# Patient Record
Sex: MALE | Race: WHITE | NOT HISPANIC OR LATINO | Employment: PART TIME | URBAN - METROPOLITAN AREA
[De-identification: names, ages, dates, MRNs, and addresses within clinical notes are randomized per-mention and may not be internally consistent; named-entity substitution may affect disease eponyms.]

---

## 2019-10-10 ENCOUNTER — NURSE TRIAGE (OUTPATIENT)
Dept: NURSING | Facility: CLINIC | Age: 79
End: 2019-10-10

## 2019-10-22 ASSESSMENT — MIFFLIN-ST. JEOR: SCORE: 1512.43

## 2019-10-29 ENCOUNTER — ANESTHESIA - HEALTHEAST (OUTPATIENT)
Dept: SURGERY | Facility: CLINIC | Age: 79
End: 2019-10-29

## 2019-10-29 ENCOUNTER — SURGERY - HEALTHEAST (OUTPATIENT)
Dept: SURGERY | Facility: CLINIC | Age: 79
End: 2019-10-29

## 2019-10-29 ASSESSMENT — MIFFLIN-ST. JEOR: SCORE: 1482.91

## 2020-11-25 ENCOUNTER — COMMUNICATION - HEALTHEAST (OUTPATIENT)
Dept: SCHEDULING | Facility: CLINIC | Age: 80
End: 2020-11-25

## 2020-12-07 ENCOUNTER — OFFICE VISIT - HEALTHEAST (OUTPATIENT)
Dept: INTERNAL MEDICINE | Facility: CLINIC | Age: 80
End: 2020-12-07

## 2020-12-07 DIAGNOSIS — I25.10 CORONARY ARTERY DISEASE WITHOUT ANGINA PECTORIS, UNSPECIFIED VESSEL OR LESION TYPE, UNSPECIFIED WHETHER NATIVE OR TRANSPLANTED HEART: ICD-10-CM

## 2020-12-07 DIAGNOSIS — E78.5 DYSLIPIDEMIA: ICD-10-CM

## 2020-12-07 DIAGNOSIS — Z00.00 ENCOUNTER FOR MEDICAL EXAMINATION TO ESTABLISH CARE: ICD-10-CM

## 2020-12-07 DIAGNOSIS — F03.90 DEMENTIA WITHOUT BEHAVIORAL DISTURBANCE, UNSPECIFIED DEMENTIA TYPE: ICD-10-CM

## 2020-12-07 DIAGNOSIS — I10 ESSENTIAL HYPERTENSION: ICD-10-CM

## 2021-01-19 ENCOUNTER — HOSPITAL ENCOUNTER (OUTPATIENT)
Dept: NEUROLOGY | Facility: CLINIC | Age: 81
Setting detail: THERAPIES SERIES
Discharge: STILL ADMITTED OR ADMINISTRATIVE | End: 2021-01-19
Attending: INTERNAL MEDICINE

## 2021-01-19 DIAGNOSIS — F03.90 MAJOR NEUROCOGNITIVE DISORDER (H): ICD-10-CM

## 2021-01-19 DIAGNOSIS — F03.90 DEMENTIA WITHOUT BEHAVIORAL DISTURBANCE, UNSPECIFIED DEMENTIA TYPE: ICD-10-CM

## 2021-01-26 ENCOUNTER — HOSPITAL ENCOUNTER (OUTPATIENT)
Dept: NEUROLOGY | Facility: CLINIC | Age: 81
Setting detail: THERAPIES SERIES
Discharge: STILL ADMITTED OR ADMINISTRATIVE | End: 2021-01-26
Attending: CLINICAL NEUROPSYCHOLOGIST

## 2021-01-26 DIAGNOSIS — F03.90 DEMENTIA WITHOUT BEHAVIORAL DISTURBANCE, UNSPECIFIED DEMENTIA TYPE: ICD-10-CM

## 2021-02-02 ENCOUNTER — HOSPITAL ENCOUNTER (OUTPATIENT)
Dept: NEUROLOGY | Facility: CLINIC | Age: 81
Setting detail: THERAPIES SERIES
Discharge: STILL ADMITTED OR ADMINISTRATIVE | End: 2021-02-02
Attending: INTERNAL MEDICINE

## 2021-02-02 DIAGNOSIS — F03.90 MAJOR NEUROCOGNITIVE DISORDER (H): ICD-10-CM

## 2021-03-09 ENCOUNTER — OFFICE VISIT - HEALTHEAST (OUTPATIENT)
Dept: INTERNAL MEDICINE | Facility: CLINIC | Age: 81
End: 2021-03-09

## 2021-03-09 DIAGNOSIS — R41.89 COGNITIVE DECLINE: ICD-10-CM

## 2021-03-09 DIAGNOSIS — F39 UNSPECIFIED MOOD (AFFECTIVE) DISORDER (H): ICD-10-CM

## 2021-03-09 LAB
ALBUMIN SERPL-MCNC: 4.4 G/DL (ref 3.5–5)
ALP SERPL-CCNC: 85 U/L (ref 45–120)
ALT SERPL W P-5'-P-CCNC: 14 U/L (ref 0–45)
ANION GAP SERPL CALCULATED.3IONS-SCNC: 11 MMOL/L (ref 5–18)
AST SERPL W P-5'-P-CCNC: 19 U/L (ref 0–40)
BILIRUB SERPL-MCNC: 0.5 MG/DL (ref 0–1)
BUN SERPL-MCNC: 21 MG/DL (ref 8–28)
CALCIUM SERPL-MCNC: 9.2 MG/DL (ref 8.5–10.5)
CHLORIDE BLD-SCNC: 105 MMOL/L (ref 98–107)
CO2 SERPL-SCNC: 25 MMOL/L (ref 22–31)
CREAT SERPL-MCNC: 0.87 MG/DL (ref 0.7–1.3)
ERYTHROCYTE [DISTWIDTH] IN BLOOD BY AUTOMATED COUNT: 12.1 % (ref 11–14.5)
FOLATE SERPL-MCNC: 12.5 NG/ML
GFR SERPL CREATININE-BSD FRML MDRD: >60 ML/MIN/1.73M2
GLUCOSE BLD-MCNC: 88 MG/DL (ref 70–125)
HCT VFR BLD AUTO: 48.6 % (ref 40–54)
HGB BLD-MCNC: 16.4 G/DL (ref 14–18)
MCH RBC QN AUTO: 31.1 PG (ref 27–34)
MCHC RBC AUTO-ENTMCNC: 33.7 G/DL (ref 32–36)
MCV RBC AUTO: 92 FL (ref 80–100)
PLATELET # BLD AUTO: 206 THOU/UL (ref 140–440)
PMV BLD AUTO: 10.2 FL (ref 7–10)
POTASSIUM BLD-SCNC: 4.5 MMOL/L (ref 3.5–5)
PROT SERPL-MCNC: 7.1 G/DL (ref 6–8)
RBC # BLD AUTO: 5.28 MILL/UL (ref 4.4–6.2)
SODIUM SERPL-SCNC: 141 MMOL/L (ref 136–145)
TSH SERPL DL<=0.005 MIU/L-ACNC: 1.3 UIU/ML (ref 0.3–5)
VIT B12 SERPL-MCNC: 308 PG/ML (ref 213–816)
WBC: 9 THOU/UL (ref 4–11)

## 2021-03-13 LAB — METHYLMALONATE SERPL-SCNC: 0.42 UMOL/L (ref 0–0.4)

## 2021-03-15 ENCOUNTER — COMMUNICATION - HEALTHEAST (OUTPATIENT)
Dept: INTERNAL MEDICINE | Facility: CLINIC | Age: 81
End: 2021-03-15

## 2021-03-31 ENCOUNTER — COMMUNICATION - HEALTHEAST (OUTPATIENT)
Dept: PEDIATRICS | Facility: CLINIC | Age: 81
End: 2021-03-31

## 2021-03-31 ENCOUNTER — COMMUNICATION - HEALTHEAST (OUTPATIENT)
Dept: ADMINISTRATIVE | Facility: CLINIC | Age: 81
End: 2021-03-31

## 2021-03-31 DIAGNOSIS — Z00.00 ROUTINE GENERAL MEDICAL EXAMINATION AT A HEALTH CARE FACILITY: ICD-10-CM

## 2021-03-31 DIAGNOSIS — R41.3 MEMORY LOSS: ICD-10-CM

## 2021-03-31 DIAGNOSIS — R79.89 OTHER SPECIFIED ABNORMAL FINDINGS OF BLOOD CHEMISTRY: ICD-10-CM

## 2021-04-02 ENCOUNTER — COMMUNICATION - HEALTHEAST (OUTPATIENT)
Dept: ADMINISTRATIVE | Facility: CLINIC | Age: 81
End: 2021-04-02

## 2021-04-02 DIAGNOSIS — G47.30 SLEEP DISORDER BREATHING: ICD-10-CM

## 2021-04-05 ENCOUNTER — COMMUNICATION - HEALTHEAST (OUTPATIENT)
Dept: INTERNAL MEDICINE | Facility: CLINIC | Age: 81
End: 2021-04-05

## 2021-04-05 ENCOUNTER — AMBULATORY - HEALTHEAST (OUTPATIENT)
Dept: LAB | Facility: CLINIC | Age: 81
End: 2021-04-05

## 2021-04-05 ENCOUNTER — TELEPHONE (OUTPATIENT)
Dept: SLEEP MEDICINE | Facility: CLINIC | Age: 81
End: 2021-04-05

## 2021-04-05 DIAGNOSIS — R79.89 OTHER SPECIFIED ABNORMAL FINDINGS OF BLOOD CHEMISTRY: ICD-10-CM

## 2021-04-05 DIAGNOSIS — R41.3 MEMORY LOSS: ICD-10-CM

## 2021-04-05 LAB — HBA1C MFR BLD: 5.4 %

## 2021-04-05 NOTE — TELEPHONE ENCOUNTER
Letter has been mailed to Bill today with instructions to call [phone] if he would like to schedule a virtual sleep consultation.     Tawny MIN CMA, [facility], 4/5/2021 3:34 PM

## 2021-04-12 ENCOUNTER — AMBULATORY - HEALTHEAST (OUTPATIENT)
Dept: NEUROLOGY | Facility: CLINIC | Age: 81
End: 2021-04-12

## 2021-04-20 ENCOUNTER — OFFICE VISIT - HEALTHEAST (OUTPATIENT)
Dept: INTERNAL MEDICINE | Facility: CLINIC | Age: 81
End: 2021-04-20

## 2021-04-20 DIAGNOSIS — R41.89 COGNITIVE DECLINE: ICD-10-CM

## 2021-04-22 ENCOUNTER — COMMUNICATION - HEALTHEAST (OUTPATIENT)
Dept: ADMINISTRATIVE | Facility: CLINIC | Age: 81
End: 2021-04-22

## 2021-06-02 NOTE — ANESTHESIA CARE TRANSFER NOTE
Last vitals:   Vitals:    10/29/19 1542   BP: 151/81   Pulse: 66   Resp: 14   Temp: 37.1  C (98.7  F)   SpO2: 99%     Patient's level of consciousness is awake and drowsy  Spontaneous respirations: yes  Maintains airway independently: yes  Dentition unchanged: yes  Oropharynx: oropharynx clear of all foreign objects    QCDR Measures:  ASA# 20 - Surgical Safety Checklist: WHO surgical safety checklist completed prior to induction    PQRS# 430 - Adult PONV Prevention: 4558F - Pt received => 2 anti-emetic agents (different classes) preop & intraop  ASA# 8 - Peds PONV Prevention: NA - Not pediatric patient, not GA or 2 or more risk factors NOT present  PQRS# 424 - Lulu-op Temp Management: 4559F - At least one body temp DOCUMENTED => 35.5C or 95.9F within required timeframe  PQRS# 426 - PACU Transfer Protocol: - Transfer of care checklist used  ASA# 14 - Acute Post-op Pain: ASA14A - Patient experienced pain >= 7 out of 10

## 2021-06-03 VITALS — HEIGHT: 67 IN | BODY MASS INDEX: 27.83 KG/M2 | WEIGHT: 177.31 LBS

## 2021-06-05 VITALS
DIASTOLIC BLOOD PRESSURE: 80 MMHG | BODY MASS INDEX: 27.88 KG/M2 | SYSTOLIC BLOOD PRESSURE: 128 MMHG | WEIGHT: 178 LBS | HEART RATE: 64 BPM

## 2021-06-05 VITALS
WEIGHT: 178.6 LBS | DIASTOLIC BLOOD PRESSURE: 80 MMHG | HEART RATE: 80 BPM | BODY MASS INDEX: 27.97 KG/M2 | SYSTOLIC BLOOD PRESSURE: 140 MMHG

## 2021-06-05 VITALS
BODY MASS INDEX: 28.35 KG/M2 | HEART RATE: 72 BPM | SYSTOLIC BLOOD PRESSURE: 134 MMHG | WEIGHT: 181 LBS | DIASTOLIC BLOOD PRESSURE: 80 MMHG

## 2021-06-05 NOTE — ANESTHESIA POSTPROCEDURE EVALUATION
Patient: Roland Caballero  CYSTOSCOPY URETHRAL DILITATION RIGHT RETROGRADE PYELOGRAM, RIGHT URETEROSCOPY WITH LASER LITHOTRIPSY RIGHT URETERAL STENT  PLACEMENT STONE EXTRACTION  Anesthesia type: general    Patient location: PACU  Last vitals:   Vitals Value Taken Time   /87 10/29/2019  4:56 PM   Temp 36.9  C (98.5  F) 10/29/2019  4:21 PM   Pulse 77 10/29/2019  4:56 PM   Resp 20 10/29/2019  4:56 PM   SpO2 96 % 10/29/2019  4:56 PM     Post vital signs: stable  Level of consciousness: lethargic and responds to simple questions  Post-anesthesia pain: pain controlled  Post-anesthesia nausea and vomiting: no  Pulmonary: unassisted, return to baseline  Cardiovascular: stable and blood pressure at baseline  Hydration: adequate  Anesthetic events: no    QCDR Measures:  ASA# 11 - Lulu-op Cardiac Arrest: ASA11B - Patient did NOT experience unanticipated cardiac arrest  ASA# 12 - Lulu-op Mortality Rate: ASA12B - Patient did NOT die  ASA# 13 - PACU Re-Intubation Rate: ASA13B - Patient did NOT require a new airway mgmt  ASA# 10 - Composite Anes Safety: ASA10A - No serious adverse event    Additional Notes:

## 2021-06-13 NOTE — PROGRESS NOTES
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  He was referred to neuropsych today.  Follow-up with us as needed.    #2.  Hyperlipidemia.  Will need to have him in for a complete physical exam to check his lipids within the next few months should he choose to continue having his primary care at this office.    Coronary artery disease, stable.    Problem List Items Addressed This Visit     RESOLVED: Essential hypertension    Dyslipidemia    CAD (coronary artery disease)      Other Visit Diagnoses     Dementia without behavioral disturbance, unspecified dementia type (H)    -  Primary    Relevant Orders    Neuropsychological testing    Encounter for medical examination to establish care              There are no Patient Instructions on file for this visit.    Medications Discontinued During This Encounter   Medication Reason     oxyCODONE-acetaminophen (PERCOCET) 5-325 mg per tablet Therapy completed     aspirin 81 mg chewable tablet      atorvastatin (LIPITOR) 80 MG tablet      nitroglycerin (NITROSTAT) 0.4 MG SL tablet      ondansetron (ZOFRAN-ODT) 4 MG disintegrating tablet        No follow-ups on file.    CHIEF COMPLAINT:  Chief Complaint   Patient presents with     Establish Care       HISTORY OF PRESENT ILLNESS:  Roland Caballero is a 80 y.o. male  presenting to the clinic today for establishing care along with a neuropsych referral.  Marcelino is feeling well today and has no acute complaints.  Past medical history is consistent for coronary artery disease, status post stenting in 2014.  He also has a history of dyslipidemia.  He is on no prescription medications at this time.  He has no known drug allergies.  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  Lifelong non-smoker.    The reason for his visit is the need for referral to a neuropsychologist.  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  He apparently needed more time than was normal to fill out the questions and was unsure of how to pay for his renewal.  They also noticed that he had a hand tremor as well.  Because of these, he had a formal request for evaluation by the Board of dentistry in order to continue being licensed.  They recommended a full neuropsychologic evaluation to be performed and sent to them.  Marcelino does not have a primary care physician and thus presented to me to get this referral.    REVIEW OF SYSTEMS:   Pertinent positives noted in HPI, remainder of ROS is negative.    MEDICATIONS:  No current outpatient medications on file.     No current facility-administered medications for this visit.        TOBACCO USE:  Social History     Tobacco Use   Smoking Status Never Smoker   Smokeless Tobacco Never Used       VITALS:  Vitals:    12/07/20 1503   BP: 140/80   Pulse: 80   Weight: 178 lb 9.6 oz (81 kg)     Wt Readings from Last 3 Encounters:   12/07/20 178 lb 9.6 oz (81 kg)   10/29/19 177 lb 5 oz (80.4 kg)         PHYSICAL EXAM:  Constitutional:  Reveals an alert, pleasant male.   HEET: Normocephalic, without obvious abnormality, atraumatic.   Neurologic: Normal gait and station  Psychologic: Normal affect

## 2021-06-13 NOTE — TELEPHONE ENCOUNTER
"Patient calls because he is needing to set up an appointment with a provider. 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 He says that \"he never writes.\" Denies any further concerns.    Patient is sent to scheduling to set up an appointment.    Anita Monroy RN, BSN Nurse Triage Advisor 11:59 AM 11/25/2020        "

## 2021-06-13 NOTE — TELEPHONE ENCOUNTER
New Appointment Needed  What is the reason for the visit:    Same Date/Next Day Appt Request  What is the reason for your visit?: wants to see Dr. Prem Chralton as his PCP- And needs to be referred to  Neuropsychology  Patient spouse discussed with Dr. Charlton about taking the patient on and becoming his PCP    Provider Preference: Dr. Prem Charlton  How soon do you need to be seen?: as soon as possible  Waitlist offered?: No  Okay to leave a detailed message:  Yes

## 2021-06-14 NOTE — PROGRESS NOTES
"NEUROPSYCHOLOGY TELE-HEALTH FEEDBACK VISIT  [facility]      Telephone Visit  Roland Caballero is a 80 y.o. male who is being evaluated via a billable telephone visit.     The patient has been notified of the following:      \"This telephone visit will be conducted via a call between you and your provider. We have found that certain health care needs can be provided without the need for a physical exam.  This service lets us provide the care you need with a phone conversation. If during the course of the call the provider feels a telephone visit is not appropriate, you will not be charged for this service.\"     Patient has given verbal consent to a Telephone visit? Yes  Consent has been obtained for this service by 1 care team member: yes.      Visit Summary  The purpose of today s appointment was to provide feedback regarding Mr. Caballero recent neuropsychological telehealth consult completed on 1/19 and 1/26/2021. Unfortunately, his wife was unable to join us for the visit today. We began the session by discussing his experience during the evaluation. Mr. Caballero reported that he was feeling frustrated during the testing session, and noted that \"I never do those things in my daily life\" and \"I just wanted to get it over with.\" He acknowledged that some things were quite difficult, which he attributed to being unfamiliar with the test stimuli.     I provided Mr. Caballero with detailed feedback regarding his performance on cognitive testing and his pattern of cognitive strengths and weaknesses. The patient again stated that he was not putting forth his best effort during testing, and while that may be true to some extent, most of his responses are very much suggestive of genuine cognitive dysfunction. There were no concerns about effort or low motivation/engagement noted by the examiner, and embedded effort indices were within normal limits.     I also raised concern about the observations of " "mild right-handed tremor during testing. Mr. 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 It may be that his tremor is anxiety-induced; regardless, further evaluation remains warranted given the nature of his job. Per [place] [employer] Rules of Conduct, I am required to report any concern about another provider who may be \"unable to practice with reasonable skill and safety as a result of a physical or mental illness or condition.\" I contacted the Board of Dentistry on 2/2/2021. I also reached out to the referring provider, Dr. Charlton, and routed my report to him.    Mr. Caballero acknowledged that he may have some mild cognitive difficulties and is willing to pursue further evaluation with Dr. Charlton and potentially neurology (if Dr. Charlton agrees that this would be an appropriate referrral). I discussed my recommendations and provided the opportunity for Mr. Caballero to ask any questions that he had about the evaluation. At the end of the session, he indicated that he understood the results and that I had answered all of his questions. Despite the discrepancies between the test results and his subjective experiences of his daily functioning, he remained pleasant and was appreciative of my concern and our conversation today.      Little Patino PsyD, LP  Licensed Clinical Neuropsychologist  [facility]  [address], [address]  [place] [address]  Phone: [phone]    Telephone Visit Details    Type of service:  Telephone Visit  Start Time: 11:00 AM  End Time: 11:45 AM    Originating Location (pt. Location): Home    Distant Location (provider location):  Remote work for [facility])    Mode of Communication:  Telephone    Roland Caballero was evaluated via a billable telephone visit. For diagnostic " and coding purposes, Mr. 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 As this is the final date for this Episode of Care (initiated on 1/19/2021) all charges for the entire Episode of Care will be filed today. Please see the 1/19/2021 evaluation for a detailed description of codes and services, including services provided today.      In brief:   1 x 96116  1 x 96132  2 x 96133  1 x 96138  7 x 96139

## 2021-06-14 NOTE — PROGRESS NOTES
The patient was seen for a neuropsychological evaluation for the purposes of diagnostic clarification and treatment planning. 74 minutes of telehealth testing and 85 minutes of face-to-face testing were provided by this writer. An additional 84 minutes were spent scoring and compiling test results.The patient was cooperative with testing. No concerns were brought to my attention. Please see Dr. Patino's report for a detailed description of the charges and interpretation and integration of the findings.    Face-to-face Start Testin ()  Face-to-face Stop Testin ()  Telehealth Start Testin ()  Telehealth Stop Testin ()  Scoring Start: 0806 ()  Scoring Stop: 0930 ()

## 2021-06-14 NOTE — PROGRESS NOTES
NEUROPSYCHOLOGICAL PROGRESS NOTE    NAME: Roland Caballero  YOB: 1940     DATE OF EVALUATION: 1/26/2021      Roland Caballero is a 80 y.o. male who was referred for a cognitive evaluation by his PCP, Prem Charlton MD.  Mr. Caballero already completed the clinical interview with me via telehealth on 1/19/2021 and returns for an in-clinic visit today in order to complete the testing portion of the appointment. He was administered a comprehensive neuropsychological battery via hybrid methods (i.e., face-to-face testing and remote testing from a separate room in the clinic). He was cooperative during testing and test results appear valid. Please refer to the note by trained examiner/technician from today's date for further details of today's visit. Test results are currently still being compiled and scored; therefore, impressions and findings will be provided in a forthcoming report.(typically posted within 1-2 weeks from today's date).       Little Patino PsyD, LP  Licensed Clinical Neuropsychologist  [facility] [address], [address]  [place] [address]  Phone: [phone]

## 2021-06-14 NOTE — PATIENT INSTRUCTIONS - HE
"SUMMARY OF FINDINGS:   Due to the current COVID-19 pandemic that limits in-person clinical visits, this assessment was conducted using a hybrid model of both telehealth and face-to-face methods. The face-to-face portions were conducted with PPE worn by the examiner and a face-mask for the patient. The standard administration of these tests involves in-person, direct face-to-face methods. The full impact of applying non-standard administration methods via remote technology or with PPE is not fully appreciated at this time. As such, the diagnostic conclusions and recommendations for treatment provided in this report are being advanced with caution.    With these limitations in mind, results of testing indicate that Mr. Caballero is of estimated average to high average premorbid intellectual functioning; however, most of Mr. Caballero's performances fall well below that estimate. Specifically, he exhibits largely moderate to severe impairments in auditory-verbal attention/concentration, verbal and nonverbal learning/memory, confrontation naming, semantic fluency, and executive functions (including mental flexibility/set-shifting, novel problem-solving, and cognitive inhibition). In addition, he is unable to state the current year (\"19... no, 90...\"), underestimated the current time by nearly 7 hours, and noted his current age to be \"74... no, 76\" (instead of 80). In addition, information processing speed is variable, with scores ranging from exceptionally low to average. The only performances that are within normal limits are on tests of visuospatial processing, single-word reading, and manual dexterity with his dominant (right) hand. Qualitatively, he exhibited a mild tremor in his right hand, which was observed both at rest and in action throughout the testing session.     With regard to his learning/memory more specifically, he appears to exhibit difficulties learning new information for both verbal and visual materials. " His inefficient learning may be exacerbated by his impaired attention/concentration and variable processing speed to at least some degree. His memory of what he initially learns is also impaired for his age, with retention rates ranging from 0% (on a word list task) to 38% (on a story task) for verbal information and 50% for visual information after a delay of 20 minutes. Of note, he was unable to recall ever being read the word list after it was presented to him three times 20 minutes prior. On 2 out of the 3 memory tasks, his recall was not aided by prompts/cues on recognition testing. He did better on the word list recognition trial (with a score in the low average range), but was also noted by the examiner to be guessing throughout the task. Overall, his performances on memory testing are most suggestive of an encoding deficit.    Emotionally, the patient endorses minimal symptoms of anxiety and depression on self-report questionnaires. This is consistent with he and his wife's reports during the clinical interview. Current suicidal ideation is denied.    PRELIMINARY IMPRESSIONS:    Test results reveal rather profound impairments in most cognitive domains, including auditory-verbal attention/concentration, verbal and nonverbal learning/memory, confrontation naming, semantic fluency, and executive functions. In addition, information processing speed is variable.    Visuospatial processing is a relative strength, with scores generally falling in the average range for his age.    Overall, test results are suggestive of mild-to-moderate dysfunction in the bilateral prefrontal and temporal regions, including mesial temporal structures.    These results are in paez contrast to the patient and his wife's subjective reports, as they both denied having any significant concerns about the patient's cognitive functioning. 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 I suspect that the root canal procedure is a rather rote task at this point, and that routine and rote tasks are supporting his ability to perform various activities without significant issues at this point, despite having clear dementia-level cognitive impairment.    Etiology of his current cognitive difficulties is uncertain but likely multifactorial. Further neurologic and medical evaluations are warranted in order to rule out reversible/treatable causes of cognitive impairment (e.g., infection, vitamin deficiencies, etc.) and obtain more diagnostic clarity.     If all other reversible causes of cognitive impairment are ruled out, a combination of neurodegenerative syndrome(s) should be considered. Many aspects of his profile support an Alzheimer's disease process (e.g., encoding deficits on memory testing, impaired confrontation naming, and impaired semantic fluency with better phonemic fluency). In addition, consider possible Parkinson's disease or vascular dementia processes given his prefrontal dysfunction and asymmetrical tremor.    The patient denies any issues with balance, changes in his gait, or incontindence problems, making NPH less likely. Personality or behavioral changes were denied, making frontotemporal dementia unlikely as well. He is not taking any medications at this time, his sleep is normal, and he is not using any substances. There is no support for a psychiatric etiology either.    PRELIMINARY DIAGNOSIS:  Unspecified Major Neurocognitive Disorder (further medical/neurologic evaluation needed)    RECOMMENDATIONS:  1) Referral to neurology is strongly recommended for further evaluation, monitoring, and (potentially) treatment. His primary care provider will likely order this if the patient is amenable.    2) Consider obtaining neuroimaging (preferably MRI) in order to help clarify the possible  etiology of Mr. Caballero's cognitive difficulties. This recommendation will be deferred to the patient's primary care provider and/or neurology.    3) Consider obtaining relevant laboratory tests to help clarify etiology (e.g., vitamin B12, MMA, folate, TSH, etc.). Also consider checking A1c, cholesterol, and other blood work panels given the patient's wife's concerns about his significant weight loss and possible symptoms of low blood sugar and diabetes. This recommendation will be deferred to the patient's primary care provider and/or neurology.    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 I recommend that he take a medical leave from work until further work-up of these difficulties can be completed. If reversible/treatable causes of his cognitive impairment are not found, I would recommend that he retire. He is already planning to retire in a few months.    5) Given his profile characterized by executive dysfunction, inattention, and variable processing speed, Mr. Caballero is strongly encouraged to continue to refrain from driving, at least until further evaluation of his cognitive deficits can be performed. If he insists that he is still safe to drive, he is encouraged to undergo a formal driving evaluation. Possible options for formal driving evaluations would be: [facility] ([phone]), [facility] ([phone]) or any option recommended by his physician.     6) Increased oversight is also recommended while performing other complex and/or novel tasks, such as financial management. If not already done, completion of paperwork for advance directives and assignment of healthcare and financial Power of  should be considered at this time.    7) The patient is encouraged to utilize cognitive compensatory strategies in daily life, including utilizing note pads, checklists, to-do lists, a calendar/planner, labeled alarm  reminders, a GPS, a pillbox, and maintaining a daily morning and nighttime routine and an organized living/work environment.    8) Repeat neuropsychological testing can be performed in 12-18 months (or as clinically indicated) in order to monitor his cognitive status, help to clarify etiology, and update recommendations. The current test data can be used as a baseline to which future comparisons can be made.

## 2021-06-14 NOTE — PROGRESS NOTES
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 digits backward (a below average score), and up to 2 digits in sequence (an exceptionally low score). On a test of complex concentration that requires speeded numeric sequencing (Trails A), the patient's score was average for completion time and without error. In contrast, on a more difficult version of that task that requires speeded alpha-numeric sequencing/cognitive set-shifting (Trails B), the task was discontinued after time time limit was reached; the patient became stuck at the letter F and made 4 total errors during that time.     PROCESSING SPEED. On the DKEFS Color-Word Interference Test, speeded color naming and speeded word reading were both exceptionally low.     LANGUAGE PROCESSING. Language comprehension appeared largely intact. Confrontation naming was measured as an exceptionally low score (24/60). He required several semantic cues and made numerous semantic substitution errors on that task, and benefited minimally from phonemic cues (+11/36). The patient's performance on a test of phonemic fluency resulted in a low average score (COWAT) while his " "semantic fluency was exceptionally low (Category Fluency). His writing sample was within normal limits with no evidence of micrographia (he wrote, \"I love my wife!\").    VISUOSPATIAL ABILITY. The WAIS-IV Perceptual Reasoning Index was in the low average range (pro-rated MIN = 88), with average nonverbal abstract reasoning (Matrix Reasoning), and average visuoconstruction with three-dimensional blocks (Block Design). Of note, it took about 10 minutes to explain to him how to position the blocks on the sample items of the Block Design task. Spatial judgment, as measured by Judgment of Line Orientation, was classified as an average score. Copy of a complex geometric figure was within normal limits but piecemeal in approach (RCFT Copy). Of note, a mild tremor was observed during that task and it took him over 7 minutes to copy the figure (he was only given credit for what was drawn up to 5 minutes, which is the maximum time limit for that task). Copy of six simple geometric figures was within normal limits (BVMT-R Copy). Once again, a slight tremor was noted. On a Clock Drawing Task, the patient was able to draw a small but well-formed [place]. He wrote in the numbers in clockwise order, which were adequately spaced and correctly placed on the right side of the clock; however, his spacing and placing of the numbers were slightly off on the left side of the clock, suggestive of suboptimal planning. Although he was instructed to make the clock read a specified time, he omitted the clock hands altogether.      LEARNING/MEMORY. On the WMS-IV Logical Memory subtest, immediate memory for paragraph-length stories was scored as below average. His score on the delayed memory was also below average, with a 38% retention rate. Delayed recognition of that same material was exceptionally low, and he appeared to be guessing on that task.     On a 12-item verbal list-learning task (HVLT-R), the patient acquired up to 4 words (33%) of " the word list by the 3rd and final learning trial (raw scores over trials = 1, 4, 4). Total learning acquisition was classified as an exceptionally low score. After a 20-minute delay, the patient was unable to recall any of the items (0% retention rate) and was unable to recall ever being read the list to begin with (an exceptionally low score). However, his recognition discriminability score was low average, as he correctly recognized 10/12 items and made 2 false-positive errors.    On a visual memory measure (BVMT-R), immediate recall of six simple geometric figures over three learning trials was scored as exceptionally low (raw scores over trials = 1, 2, 4 out of 12). Delayed recall of the figures was also exceptionally low, with a 50% retention rate (raw score = 2 out of 12). Recognition discriminability was also classified as an exceptionally low score, as he correctly recognized 6/6 figures but also made 5 false-positive errors.     EXECUTIVE FUNCTIONS. Concept identification and the ability to generate alternative problem solving strategies was below expectation for age on the [place] Card Sorting Test. Specifically, he completed 0 out of 3 categories (below average) with a total of 41 errors, which is an exceptionally low score. Thirty of his errors were perseverative (an exceptionally low score) and there was one failure to maintain set. On a test of inhibition and cognitive flexibility (DKEFS Color-Word Interference Inhibition trial), the patient had notable difficulty comprehending task demands and was unable to complete the sample items. Thus, that task was discontinued. On a task that requires speeded alpha-numeric sequencing/cognitive set-shifting (Trails B), the task was discontinued at the maximum time limit after the patient became stuck at letter F (he had made 4 errors at that point as well). Nonverbal abstract reasoning was classified as an average score (WAIS-IV Matrix Reasoning). Phonemic  fluency was low average (COWAT). Performance on the Clock Drawing Test was impaired, as he was unable to accurately represent analog time.     PSYCHOMOTOR FUNCTIONS. Manual dexterity, as measured by the Grooved Pegboard test, was low average with his dominant (right) hand. A mild tremor was noted, and he often attempted to  the pegs with both hands (instead of just his dominant hand, as instructed). He dropped one peg during the task. He was unable to complete the task with his left hand due to mobility issues s/p surgery.     MOOD. On the GDS-15 a self report measure of depressive symptomatology, he obtained a score of 1, placing him in the range of normal depression. He denied suicidal ideation. On the CORRINE-7, a self-report measure of anxiety, he obtained a score of 3,  placing him in the range of minimal anxiety.    ____________________________________________________________________________________    SERVICES PROVIDED & TIME:  Video Visit Details  Type of service: Video Visit    Interview & Initial Testing with Provider (Dr. Little Patino) on 1/19/2021:   Video Start Time: 12:30 PM   Video End Time: 1:43 PM    Originating Location (pt. Location): Patient's Home  Distant Location (provider location): Remote work for [facility])    Mode of Communication:  Video Conference via [employer]     On-site Office Visit Details   (including both face-to-face time and remote testing with on-site equipment)  Type of service: Office Visit    Face-to-Face Testing with Trained Examiner/Technician (Mariann Ellis) on 1/26/2021:   Start Time: 12:23 PM   End Time: 1:48 PM    In-Office Remote Testing with Trained Examiner/Technician (Mariann Ellis) on 1/26/2021:   Video Start Time: 1:53 PM   Video End Time: 3:07 PM    Originating and Distant Location (patient and provider location): [facility])    Mode of  Communication: Video Conference via [employer]    A clinical interview/neurobehavioral status examination was conducted with the patient and documented. I thoroughly reviewed the medical record, selected the neuropsychological test battery, provided supervision to the trained examiner/technician, interpreted/integrated patient data and test results, and engaged in clinical decision making, treatment planning, report writing/preparation and provision of interactive feedback of test results on 2/2/2021. A trained examiner/technician administered and scored the neuropsychological tests (2+ tests).  Please see below for a breakdown of time spent and the associated codes billed for these services.  Services   Time Spent  CPT Codes   Neurobehavioral Status Exam:  (e.g., clinical interview and neurobehavioral status exam via telehealth, interpretation, report)   82 minutes   1 x 96116     Neuropsychological Evaluation Services:   (e.g., integration, interpretation, treatment planning, clinical decision making, feedback via telehealth)   201 minutes   1 x 96132  2 x 96133   Neuropsychological Testing by Trained Examiner/Technician:  (e.g., test administration, scoring, 2+ tests administered)   243 minutes   1 x 96138  7 x 96139   For diagnostic and coding purposes, Mr. Caballero has a history of coronary artery disease, hyperlipidemia,  history of MI and stent placement in 2014, and tremor. 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 Please note, all charges are filed at the completion of the Episode of Care and associated with the final encounter date (feedback session on 2/2/2021).

## 2021-06-15 NOTE — PROGRESS NOTES
Assessment & Plan   Problem List Items Addressed This Visit     None      Visit Diagnoses     Cognitive decline    -  Primary    Relevant Orders    HM2(CBC w/o Differential)    Comprehensive Metabolic Panel    Thyroid Cascade    Vitamin B12    Folate, Serum    Methylmalonic Acid (MMA), Quantitative    Unspecified mood (affective) disorder (H)         Relevant Orders    Thyroid [place]         Cognitive dysfunction of unknown etiology.  We will begin by checking a CBC, CMP, thyroid cascade, B12 level, folate level, and an MMA level.  I recommended getting an MRI of the brain but they wanted to hold off on this until the lab tests return.  I will call him with results and further recommendations.            No follow-ups on file.    Prem Charlton MD  [facility]   Roland Caballero is 80 y.o. 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  I last saw him in early December 2020.  At that time he was making some mistakes on his application for his license renewal.  The Board of dentistry was concerned about cognitive deficits and was told to follow-up with his primary care physician about this.  Since he did not have a primary he made an appointment with me and he was subsequently referred for neuropsychiatric testing.  He underwent testing on 1/19/2021, which showed evidence of cognitive dysfunction of unknown etiology, likely involving the bilateral prefrontal and temporal cortices.  He was also noted to have a tremor in the right hand both at rest and with activity.  The results of the test were given to him and he was told to follow-up with me today for further evaluation and work-up.  Roland and I spent some time going over his tests and discussing what they meant.  I explained that we currently do not know an etiology for his cognitive dysfunction and  will need to look into it further today.          Objective    There were no vitals taken for this visit.  There is no height or weight on file to calculate BMI.  Physical Exam

## 2021-06-16 PROBLEM — E78.5 DYSLIPIDEMIA: Status: ACTIVE | Noted: 2018-09-04

## 2021-06-16 PROBLEM — I25.10 CAD (CORONARY ARTERY DISEASE): Status: ACTIVE | Noted: 2018-09-04

## 2021-06-16 NOTE — TELEPHONE ENCOUNTER
Called pt. Him and his wife said that they discussed drawing a glycohgb with you at the last visit. They want to know why it wasn't ordered. Will you order this?

## 2021-06-16 NOTE — TELEPHONE ENCOUNTER
Dr. Charlton's comments from the most lab result letter was read off to the patient. This is being printed off and mailed again to make sure he gets it.

## 2021-06-16 NOTE — TELEPHONE ENCOUNTER
Reason for Call:  Request for results:    Name of test or procedure: Labs    Date of test of procedure: 3/9/2021    Location of the test or procedure:  Clinic    OK to leave the result message on voice mail or with a family member? Yes    Phone number Patient can be reached at: Home number on file [phone] (home)    Additional comments: Got the results back on the low Vit b12 but didn't get any other info on his other labs. How was his diabetes labs? Does the low vit b12 cause sxs? Please call to discuss.     Call taken on 3/31/2021 at 1:38 PM by Linda Campbell

## 2021-06-16 NOTE — PROGRESS NOTES
Marcelino came in today essentially just to update me on his memory difficulties.  He is going to see a neurologist in regards to these in 10days.  He has had formal neuropsych testing done which show profound impairment over multiple cognitive domains.  He is going to be seeing neurology to establish diagnosis.  I will await their recommendations.  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  Follow-up with us as needed.  No charge visit.

## 2021-06-16 NOTE — TELEPHONE ENCOUNTER
Pt was seen on Tues, the [facility] was supposed to fax the information right away to their .  has not received anything.

## 2021-06-16 NOTE — TELEPHONE ENCOUNTER
Reason for Call: Request for an order or referral:    Order or referral being requested: referral    Date needed: at your convenience    Has the patient been seen by the PCP for this problem? no    Additional comments:  Requesting order for Sleep apnea testing.  Reports that patient snores, stops breathing for approx 20 seconds or more while sleeping.   Did not address during appointment do to the circumstances he presented for.   Wondering what kind of symptom this would cause other than sleep apnea?    Phone number Patient can be reached at:  Home number on file [phone] (home)    Best Time:  any    Can we leave a detailed message on this number?  Yes    Call taken on 4/2/2021 at 10:47 AM by Laura L Goldberg

## 2021-06-16 NOTE — TELEPHONE ENCOUNTER
Reason for Call:  Other returning call      Detailed comments: [facility] health Care team calling with patient on the call. Patient is confused on lab work results. Marcelino is wanting his diabetic lab work. Upon looking at the patient's labs that were drawn I don't see an A1c that the patient thought was drawn. Please advise.        Phone Number Patient can be reached at: Home number on file [phone] (home)    Best Time: Anytime    Can we leave a detailed message on this number?: Yes    Call taken on 3/31/2021 at 2:19 PM by Rashi Garcia

## 2021-06-16 NOTE — PROGRESS NOTES
"Neuropsychology Note    I received a phone call from Lory Caballero (Roland Caballero's wife) asking for clarification about my recommendations based on his recent neuropsychological evaluation that I conducted on 2021 and 2021. I have been given permission by the patient to speak with Ms. Caballero.    I reiterated to Ms. 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 The decision about his ability to return to work would then ultimately be deferred to his primary care provider and/or neurologist based on their medical and neurologic assessment(s). I recommended that if no reversible or treatable causes are found, then he should retire, as cognitive impairment and tremor can result in an inability to practice within reasonable skill and safety.     Per [place] [employer] Rules of Conduct, I am required to report any concern about another provider who may be \"unable to practice with reasonable skill and safety as a result of a physical or mental illness or condition.\" I submitted my concern to the Board of Dentistry on 2021 as a mandated .    I also reiterated my recommendations to Ms. Caballero about what would be helpful as next steps, as she is understandably frustrated by the situation. I recommended the followin) Ask Dr. Charlton for a referral to neurology for further evaluation of his cognitive impairment and tremor. They can further assess Dr. Caballero's tremor and determine the nature of it (whether it is due to anxiety, low blood sugar, a benign essential tremor, a parkinsonism, etc.) and whether it can be " treated or managed. I routed my report to Dr. Charlton on 2/2/2021 and included my recommendation to enter a neurology order for them.     2) If desired, Dr. Caballero may seek a second opinion from a different neuropsychologist, particularly if Dr. Caballero feels strongly that he was not putting forth his full effort or if he was feeling overly anxious during testing. I am confident in the validity of my findings, but a second opinion for their own reassurance may be appropriate.    I will re-send Dr. Caballero a summary of my findings and recommendations, which will be mailed to his home address.    Little Patino PsyD, LP  Licensed Clinical Neuropsychologist  [facility]  [address], [address]  [place] [address]  Phone: [phone]

## 2021-06-16 NOTE — TELEPHONE ENCOUNTER
Left voicemail for patient to return call to clinic. When patient returns call, please give them below message.    Per Dr. 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 This is up to the [employer] only. Dr. Charlton declines sending a note to . If they have further questions, they will need to discuss this further with Dr. Charlton.    [facility] ............... 3:57 PM, 04/22/21     ADDENDUM:  Marcelino did not bring up anything at his appointment in regards to a letter he wanted me to send to his .  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  That is COMPLETELY up to the [place] Board of [employer].  I can talk to he and his wife Loyr over the phone if desired    [facility] 4/22, 5833

## 2021-06-16 NOTE — TELEPHONE ENCOUNTER
Wife called back, Pt had asked to get his diabetes labs done that day because he had lost a lot of weight. Doesn't look like it was done. Can provider place in order and he will come in for labs.     pls call her back.

## 2021-06-21 NOTE — LETTER
Letter by Prem Charlton MD at      Author: Prem Charlton MD Service: -- Author Type: --    Filed:  Encounter Date: 3/15/2021 Status: (Other)         Roland GILL Caballero  [address] [place] [address]             March 15, 2021         Dear Mr. Caballero,    Below are the results from your recent visit:    Resulted Orders   HM2(CBC w/o Differential)   Result Value Ref Range    WBC 9.0 4.0 - 11.0 thou/uL    RBC 5.28 4.40 - 6.20 mill/uL    Hemoglobin 16.4 14.0 - 18.0 g/dL    Hematocrit 48.6 40.0 - 54.0 %    MCV 92 80 - 100 fL    MCH 31.1 27.0 - 34.0 pg    MCHC 33.7 32.0 - 36.0 g/dL    RDW 12.1 11.0 - 14.5 %    Platelets 206 140 - 440 thou/uL    MPV 10.2 (H) 7.0 - 10.0 fL   Comprehensive Metabolic Panel   Result Value Ref Range    Sodium 141 136 - 145 mmol/L    Potassium 4.5 3.5 - 5.0 mmol/L    Chloride 105 98 - 107 mmol/L    CO2 25 22 - 31 mmol/L    Anion Gap, Calculation 11 5 - 18 mmol/L    Glucose 88 70 - 125 mg/dL    BUN 21 8 - 28 mg/dL    Creatinine 0.87 0.70 - 1.30 mg/dL    GFR MDRD Af Amer >60 >60 mL/min/1.73m2    GFR MDRD Non Af Amer >60 >60 mL/min/1.73m2    Bilirubin, Total 0.5 0.0 - 1.0 mg/dL    Calcium 9.2 8.5 - 10.5 mg/dL    Protein, Total 7.1 6.0 - 8.0 g/dL    Albumin 4.4 3.5 - 5.0 g/dL    Alkaline Phosphatase 85 45 - 120 U/L    AST 19 0 - 40 U/L    ALT 14 0 - 45 U/L    Narrative    Fasting Glucose reference range is 70-99 mg/dL per  American Diabetes Association (ADA) guidelines.   Thyroid Cascade   Result Value Ref Range    TSH 1.30 0.30 - 5.00 uIU/mL   Vitamin B12   Result Value Ref Range    Vitamin B-12 308 213 - 816 pg/mL   Folate, Serum   Result Value Ref Range    Folate 12.5 >=3.5 ng/mL   Methylmalonic Acid (MMA), Quantitative   Result Value Ref Range    MMA Serum/Plasma, Vitamin B12 Status 0.42 (H) 0.00 - 0.40 umol/L      Comment:        Slight elevation 0.41-0.99 umol/L         Consistent with mild vitamin B12 deficiency, renal         insufficiency, or intravascular volume  contraction.    Moderate elevation 1.00-9.99 umol/L          Consistent with mild vitamin B12 deficiency.    Massive elevation - Greater than or equal to 10 umol/L          Consistent with significant vitamin B12 deficiency          or with inborn errors of metabolism.  INTERPRETIVE INFORMATION: MMA Serum/Plasma,                             Vitamin B12 Status    This test was developed and its performance characteristics   determined by [facility]. It has not been cleared or   approved by the US Food and Drug Administration. This test was   performed in a CLIA certified laboratory and is intended for   clinical purposes.  Performed By: [facility]  [address] [place] [id number]  : Sally Rojo MD       Bill- Your MMA level is slightly elevated, which can indicate mild B12 deficiency (even though your serum B12 is normal.  I would recommend a daily supplement of B12 at 1000 mcg per day, and we should recheck levels in a month.  I would also recommend seeing me back in clinic at that time to re-evaluate your cognitive function.    Please call with questions or contact us using [employer].    Sincerely,        Electronically signed by Prem Charlton MD

## 2021-06-21 NOTE — LETTER
Letter by rPem Charlton MD at      Author: Prem Charlton MD Service: -- Author Type: --    Filed:  Encounter Date: 4/5/2021 Status: (Other)         Roland Caballero  [address] [place] [address]             April 5, 2021         Dear Mr. Caballero,    Below are the results from your recent visit:    Resulted Orders   Glycosylated Hemoglobin A1c   Result Value Ref Range    Hemoglobin A1c 5.4 <=5.6 %       A1c looks good    Please call with questions or contact us using [employer].    Sincerely,        Electronically signed by Prem Charlton MD

## 2021-07-04 NOTE — ADDENDUM NOTE
Addendum Note by Little Patino LP at 2/2/2021 11:00 AM     Author: Little Patino LP Service: Behavioral Author Type: Psychologist    Filed: 2/2/2021  3:39 PM Date of Service: 2/2/2021 11:00 AM Status: Signed    : Little Patino LP (Psychologist)    Encounter addended by: Little Patino LP on: 2/2/2021  3:39 PM      Actions taken: Clinical Note Signed

## 2021-07-12 ENCOUNTER — TELEPHONE (OUTPATIENT)
Dept: INTERNAL MEDICINE | Facility: CLINIC | Age: 81
End: 2021-07-12

## 2021-07-12 NOTE — TELEPHONE ENCOUNTER
Reason for Call:  Other     Detailed comments: patient wife calling requesting lab result of hemoblogin A1C be mailed to her from lab work on 4/5/21. She also wants to know if Dr. Charlton thinks patient is borderline diabetes?     Phone Number Patient can be reached at: Home number on file [phone] (home) or Cell number on file:    Telephone Information:   Mobile [phone]       Best Time: Any    Can we leave a detailed message on this number? YES    Call taken on 7/12/2021 at 4:43 PM by Angie German

## 2021-07-14 PROBLEM — I10 ESSENTIAL HYPERTENSION: Status: RESOLVED | Noted: 2018-09-04 | Resolved: 2020-12-07

## 2022-06-14 NOTE — ANESTHESIA PREPROCEDURE EVALUATION
Anesthesia Evaluation      Patient summary reviewed   No history of anesthetic complications     Airway   Mallampati: II  Neck ROM: full   Pulmonary - normal exam                          Cardiovascular - normal exam  (+) hypertension, CAD, CABG/stent, ,      Neuro/Psych      Endo/Other       GI/Hepatic/Renal            Dental - normal exam                        Anesthesia Plan  Planned anesthetic: general endotracheal    ASA 2   Induction: intravenous   Anesthetic plan and risks discussed with: patient  Anesthesia plan special considerations: antiemetics,   Post-op plan: routine recovery          
pt provided with cane adjusted to size/standard cane

## 2025-05-29 ENCOUNTER — TELEPHONE (OUTPATIENT)
Dept: INTERNAL MEDICINE | Facility: CLINIC | Age: 85
End: 2025-05-29
Payer: COMMERCIAL

## 2025-05-29 NOTE — TELEPHONE ENCOUNTER
Forms/Letter Request    Type of form/letter: OTHER: jury duty excuse       When is form/letter needed by: ASAP    How would you like the form/letter returned: Mail  Is this the correct address?: Yes  [address] [place] [facility] [address]    Patient Notified form requests are processed in 5-7 business days:Yes    After reviewing chart, pt's wife told that pt has never seen Antonio Junior and hasn't been seen in clinic for 2 years. BIll will need an appt with provider to get this letter. Wife states that she will call [facility].

## 2025-06-26 ENCOUNTER — LAB REQUISITION (OUTPATIENT)
Dept: LAB | Facility: CLINIC | Age: 85
End: 2025-06-26
Payer: COMMERCIAL

## 2025-06-26 DIAGNOSIS — I10 ESSENTIAL (PRIMARY) HYPERTENSION: ICD-10-CM

## 2025-06-26 DIAGNOSIS — E07.9 DISORDER OF THYROID, UNSPECIFIED: ICD-10-CM

## 2025-06-26 DIAGNOSIS — E11.9 TYPE 2 DIABETES MELLITUS WITHOUT COMPLICATIONS (H): ICD-10-CM

## 2025-06-26 DIAGNOSIS — E53.8 DEFICIENCY OF OTHER SPECIFIED B GROUP VITAMINS: ICD-10-CM

## 2025-06-26 DIAGNOSIS — E78.5 HYPERLIPIDEMIA, UNSPECIFIED: ICD-10-CM

## 2025-07-03 LAB
ALBUMIN SERPL BCG-MCNC: 4.4 G/DL (ref 3.5–5.2)
ALP SERPL-CCNC: 94 U/L (ref 40–150)
ALT SERPL W P-5'-P-CCNC: 29 U/L (ref 0–70)
ANION GAP SERPL CALCULATED.3IONS-SCNC: 14 MMOL/L (ref 7–15)
AST SERPL W P-5'-P-CCNC: 32 U/L (ref 0–45)
BILIRUB SERPL-MCNC: 0.4 MG/DL
BUN SERPL-MCNC: 13.2 MG/DL (ref 8–23)
CALCIUM SERPL-MCNC: 9.7 MG/DL (ref 8.8–10.4)
CHLORIDE SERPL-SCNC: 105 MMOL/L (ref 98–107)
CHOLEST SERPL-MCNC: 103 MG/DL
CREAT SERPL-MCNC: 0.87 MG/DL (ref 0.67–1.17)
EGFRCR SERPLBLD CKD-EPI 2021: 86 ML/MIN/1.73M2
ERYTHROCYTE [DISTWIDTH] IN BLOOD BY AUTOMATED COUNT: 13.1 % (ref 10–15)
EST. AVERAGE GLUCOSE BLD GHB EST-MCNC: 120 MG/DL
FASTING STATUS PATIENT QL REPORTED: NORMAL
FOLATE SERPL-MCNC: 7.1 NG/ML (ref 4.6–34.8)
GLUCOSE SERPL-MCNC: 91 MG/DL (ref 70–99)
HBA1C MFR BLD: 5.8 %
HCO3 SERPL-SCNC: 22 MMOL/L (ref 22–29)
HCT VFR BLD AUTO: 49.3 % (ref 40–53)
HDLC SERPL-MCNC: 41 MG/DL
HGB BLD-MCNC: 15.6 G/DL (ref 13.3–17.7)
LDLC SERPL CALC-MCNC: 49 MG/DL
MCH RBC QN AUTO: 29.8 PG (ref 26.5–33)
MCHC RBC AUTO-ENTMCNC: 31.6 G/DL (ref 31.5–36.5)
MCV RBC AUTO: 94 FL (ref 78–100)
NONHDLC SERPL-MCNC: 62 MG/DL
PLATELET # BLD AUTO: 258 10E3/UL (ref 150–450)
POTASSIUM SERPL-SCNC: 4 MMOL/L (ref 3.4–5.3)
PROT SERPL-MCNC: 7 G/DL (ref 6.4–8.3)
RBC # BLD AUTO: 5.24 10E6/UL (ref 4.4–5.9)
SODIUM SERPL-SCNC: 141 MMOL/L (ref 135–145)
TRIGL SERPL-MCNC: 64 MG/DL
TSH SERPL DL<=0.005 MIU/L-ACNC: 1.66 UIU/ML (ref 0.3–4.2)
VIT B12 SERPL-MCNC: 1566 PG/ML (ref 232–1245)
WBC # BLD AUTO: 9.9 10E3/UL (ref 4–11)

## 2025-07-03 PROCEDURE — 82607 VITAMIN B-12: CPT | Mod: ORL | Performed by: FAMILY MEDICINE

## 2025-07-03 PROCEDURE — 83036 HEMOGLOBIN GLYCOSYLATED A1C: CPT | Mod: ORL | Performed by: FAMILY MEDICINE

## 2025-07-03 PROCEDURE — P9604 ONE-WAY ALLOW PRORATED TRIP: HCPCS | Mod: ORL | Performed by: FAMILY MEDICINE

## 2025-07-03 PROCEDURE — 80053 COMPREHEN METABOLIC PANEL: CPT | Mod: ORL | Performed by: FAMILY MEDICINE

## 2025-07-03 PROCEDURE — 84443 ASSAY THYROID STIM HORMONE: CPT | Mod: ORL | Performed by: FAMILY MEDICINE

## 2025-07-03 PROCEDURE — 36415 COLL VENOUS BLD VENIPUNCTURE: CPT | Mod: ORL | Performed by: FAMILY MEDICINE

## 2025-07-03 PROCEDURE — 80061 LIPID PANEL: CPT | Mod: ORL | Performed by: FAMILY MEDICINE

## 2025-07-03 PROCEDURE — 82746 ASSAY OF FOLIC ACID SERUM: CPT | Mod: ORL | Performed by: FAMILY MEDICINE

## 2025-07-03 PROCEDURE — 85027 COMPLETE CBC AUTOMATED: CPT | Mod: ORL | Performed by: FAMILY MEDICINE
